# Patient Record
Sex: MALE | URBAN - METROPOLITAN AREA
[De-identification: names, ages, dates, MRNs, and addresses within clinical notes are randomized per-mention and may not be internally consistent; named-entity substitution may affect disease eponyms.]

---

## 2024-01-12 ENCOUNTER — LAB REQUISITION (OUTPATIENT)
Dept: LAB | Facility: CLINIC | Age: 54
End: 2024-01-12

## 2024-01-12 LAB
PATH REPORT.COMMENTS IMP SPEC: NORMAL
PATH REPORT.FINAL DX SPEC: NORMAL
PATH REPORT.GROSS SPEC: NORMAL
PATH REPORT.MICROSCOPIC SPEC OTHER STN: NORMAL
PATH REPORT.RELEVANT HX SPEC: NORMAL
PATH REPORT.RELEVANT HX SPEC: NORMAL
PATH REPORT.SITE OF ORIGIN SPEC: NORMAL

## 2024-01-17 ENCOUNTER — HOSPITAL ENCOUNTER (OUTPATIENT)
Facility: HOSPITAL | Age: 54
Discharge: HOME | End: 2024-01-17
Attending: EMERGENCY MEDICINE | Admitting: EMERGENCY MEDICINE
Payer: MEDICARE

## 2024-01-17 VITALS
RESPIRATION RATE: 16 BRPM | WEIGHT: 140 LBS | HEIGHT: 67 IN | TEMPERATURE: 98.1 F | DIASTOLIC BLOOD PRESSURE: 62 MMHG | HEART RATE: 68 BPM | OXYGEN SATURATION: 95 % | SYSTOLIC BLOOD PRESSURE: 128 MMHG | BODY MASS INDEX: 21.97 KG/M2

## 2024-01-17 DIAGNOSIS — J10.1 INFLUENZA B: Primary | ICD-10-CM

## 2024-01-17 LAB
FLUAV SUBTYP SPEC NAA+PROBE: NOT DETECTED
FLUBV RNA ISLT QL NAA+PROBE: DETECTED
SARS-COV-2 RNA RESP QL NAA+PROBE: NOT DETECTED
STREP A PCR: NOT DETECTED

## 2024-01-17 PROCEDURE — G0463 HOSPITAL OUTPT CLINIC VISIT: HCPCS

## 2024-01-17 PROCEDURE — 87651 STREP A DNA AMP PROBE: CPT | Performed by: EMERGENCY MEDICINE

## 2024-01-17 PROCEDURE — 87636 SARSCOV2 & INF A&B AMP PRB: CPT | Performed by: EMERGENCY MEDICINE

## 2024-01-17 NOTE — Clinical Note
[facility] [place] [address] [place] [address] [place] [address]  Phone: [phone]    Jean Carlos Perez was seen and treated in our emergency department on 1/17/2024.  He may return to work on 01/19/2024.         Thank you for choosing [facility].    Nicolle Angulo APRN

## 2024-01-17 NOTE — FSED PROVIDER NOTE
[facility]    EMERGENCY DEPARTMENT ENCOUNTER    Room Number:  12/12  Date seen:  1/17/2024  Time seen: 09:14 EST  PCP: Nargis Mclain PA  Historian: patient     HPI:  Chief complaint:cough  Context:Jean Carlos Perez is a 53 y.o. male who presents to the ED with c/o cough.  Patient reports that he has been having a nonproductive cough with congestion and sore throat that started on Saturday.  He reports that he has been running intermittent fever.  He reports that he has taken a couple doses of TheraFlu but does not feel like it is helping.  Patient denies any chest pain or shortness of breath.  He denies any nausea vomiting diarrhea.  He does report he has had a slight decrease in appetite but has been drinking normally.  Patient is nontoxic in appearance.    Timing: Constant  Duration: Since Saturday  Location: Chest and sinuses  Radiation: Nonradiating  Quality: Congestion  Intensity/Severity: Mild  Associated Symptoms: Cough, congestion, fever, sore throat  Aggravating Factors: Worse with coughing  Alleviating Factors: None alleviating  Treatment before arrival: TheraFlu    MEDICAL RECORD REVIEW  Cerebral palsy, A-fib    ALLERGIES  Patient has no known allergies.    PAST MEDICAL HISTORY  Active Ambulatory Problems     Diagnosis Date Noted    No Active Ambulatory Problems     Resolved Ambulatory Problems     Diagnosis Date Noted    No Resolved Ambulatory Problems     Past Medical History:   Diagnosis Date    Cerebral palsy     Paroxysmal atrial fibrillation        PAST SURGICAL HISTORY  History reviewed. No pertinent surgical history.    FAMILY HISTORY  History reviewed. No pertinent family history.    SOCIAL HISTORY  Social History     Socioeconomic History    Marital status:        REVIEW OF SYSTEMS  Review of Systems    All systems reviewed and negative except for those discussed in HPI.     PHYSICAL EXAM    I have reviewed the triage vital signs and nursing  notes.    ED Triage Vitals   Temp Heart Rate Resp BP SpO2   01/17/24 0907 01/17/24 0904 01/17/24 0904 01/17/24 0904 01/17/24 0904   98.1 °F (36.7 °C) 68 16 128/62 95 %      Temp src Heart Rate Source Patient Position BP Location FiO2 (%)   01/17/24 0904 01/17/24 0904 01/17/24 0904 01/17/24 0904 --   Oral Monitor Sitting Right arm        Physical Exam  Constitutional:       Appearance: Normal appearance. He is not toxic-appearing.   HENT:      Head: Normocephalic.      Right Ear: Tympanic membrane and ear canal normal.      Left Ear: Tympanic membrane and ear canal normal.      Nose: Nose normal.      Mouth/Throat:      Mouth: Mucous membranes are moist.      Pharynx: Oropharynx is clear. No oropharyngeal exudate or posterior oropharyngeal erythema.   Eyes:      Conjunctiva/sclera: Conjunctivae normal.      Pupils: Pupils are equal, round, and reactive to light.   Cardiovascular:      Rate and Rhythm: Normal rate and regular rhythm.      Pulses: Normal pulses.      Heart sounds: Normal heart sounds.   Pulmonary:      Effort: Pulmonary effort is normal.      Breath sounds: Normal breath sounds.   Musculoskeletal:         General: Normal range of motion.      Cervical back: Normal range of motion.   Skin:     General: Skin is warm.   Neurological:      General: No focal deficit present.      Mental Status: He is alert.   Psychiatric:         Mood and Affect: Mood normal.         Behavior: Behavior normal.         Vital signs and nursing notes reviewed.        LAB RESULTS  Recent Results (from the past 24 hour(s))   Rapid Strep A Screen - Swab, Throat    Collection Time: 01/17/24  9:06 AM    Specimen: Throat; Swab   Result Value Ref Range    STREP A PCR Not Detected Not Detected   COVID-19 and FLU A/B PCR, 1 HR TAT - Swab, Nasopharynx    Collection Time: 01/17/24  9:06 AM    Specimen: Nasopharynx; Swab   Result Value Ref Range    COVID19 Not Detected Not Detected - Ref. Range    Influenza A PCR Not Detected Not Detected     Influenza B PCR Detected (A) Not Detected       Ordered the above labs and independently reviewed the results.      RADIOLOGY RESULTS  No Radiology Exams Resulted Within Past 24 Hours       I ordered the above noted radiological studies. Independently reviewed by me and discussed with radiologist.  See dictation above for official radiology interpretation.      Orders placed during this visit:  Orders Placed This Encounter   Procedures    Rapid Strep A Screen - Swab, Throat    COVID-19 and FLU A/B PCR, 1 HR TAT - Swab, Nasopharynx           PROCEDURES    Procedures        MEDICATIONS GIVEN IN ER    Medications - No data to display      PROGRESS, DATA ANALYSIS, CONSULTS, AND MEDICAL DECISION MAKING    All labs have been independently reviewed by me.  All radiology studies have been reviewed by me.   EKG's independently reviewed by me.  Discussion below represents my analysis of pertinent findings related to patient's condition, differential diagnosis, treatment plan and final disposition.    I rechecked the patient.  I discussed the patient's labs, radiology findings (including all incidental findings), diagnosis, and plan for discharge.  A repeat exam reveals no new worrisome changes from my initial exam findings.  The patient understands that the fact that they are being discharged does not denote that nothing is abnormal, it indicates that no clinical emergency is present and that they must follow-up as directed in order to properly maintain their health.  Follow-up instructions (specifically listed below) and return to ER precautions were given at this time.  I specifically instructed the patient to follow-up with their PCP.  The patient understands and agrees with the plan, and is ready for discharge.  All questions answered.    ED Course as of 01/17/24 1002   Wed Jan 17, 2024   0934 Influenza B PCR(!): Detected [KJ]      ED Course User Index  [KJ] Nicolle Angulo APRN       AS OF 10:02 EST VITALS:    BP  - 128/62  HR - 68  TEMP - 98.1 °F (36.7 °C) (Oral)  02 SATS - 95%    Medical Decision Making  MEDICAL DECISION  Lab interpretation:  Labs viewed by me significant for, influenza B positive    This patient presents with symptoms suspicious for likely viral upper respiratory infection. Based on history and physical doubt sinusitis. COVID test was negative. Do not suspect underlying cardiopulmonary process. I considered, but think unlikely, dangerous causes of this patient´s symptoms to include ACS, CHF or COPD exacerbations, pneumonia, pneumothorax. Patient is nontoxic appearing and not in need of emergent medical intervention.  Patient was noted to be positive for influenza B.  We discussed his discharge instructions and over-the-counter medications that he can take.  He is out of the window for Tamiflu at this time.  Patient was instructed to follow-up with his primary care provider.  He was given strict return precautions with understanding.  Patient was provided a work note.    Problems Addressed:  Influenza B: acute illness or injury    Amount and/or Complexity of Data Reviewed  Labs:  Decision-making details documented in ED Course.          DIAGNOSIS  Final diagnoses:   Influenza B       No orders of the defined types were placed in this encounter.          I performed hand hygiene on entry into the pt room and upon exit.     Note Disclaimer: At [facility], we believe that sharing information builds trust and better relationships. You are receiving this note because you recently visited [facility]. It is possible you will see health information before a provider has talked with you about it. This kind of information can be easy to misunderstand. To help you fully understand what it means for your health, we urge you to discuss this note with your provider.         Part of this note may be an electronic transcription/translation of spoken language to printed text using the Dragon Dictation System.      Appropriate PPE worn during exam.    Dictated utilizing Dragon dictation     Note Disclaimer: At [facility], we believe that sharing information builds trust and better relationships. You are receiving this note because you recently visited [facility]. It is possible you will see health information before a provider has talked with you about it. This kind of information can be easy to misunderstand. To help you fully understand what it means for your health, we urge you to discuss this note with your provider.

## 2024-01-17 NOTE — DISCHARGE INSTRUCTIONS
Thank you for letting us care for you today.  You can use Tylenol as needed for pain and fever.  Drink plenty fluids and get rest.  You can use over-the-counter medications as needed for your symptoms such as DayQuil, NyQuil, Mucinex, Cepacol throat lozenges, coolmist humidifier etc.  Follow-up with your primary care provider.  Return for any new or worsening symptoms.  You were positive for influenza B today.  Please quarantine at home for 5 days from the onset of symptoms.      Wash/sanitize common household surfaces with antibacterial wipes.  Especially door knobs, light switches. Change bed linens and wash bath towels/washcloths. Frequent handwashing. Cough/sneeze into your sleeve. Treat fever every 6-8 hours with adult/children Tylenol (generic acetaminophen)

## 2024-01-17 NOTE — Clinical Note
[facility] [place] [address] [place] [address] [place] [address]  Phone: [phone]    Jean Carlos Perez was seen and treated in our emergency department on 1/17/2024.  He may return to work on 01/19/2024.         Thank you for choosing [facility].    Nioclle Angulo APRN